# Patient Record
Sex: MALE | Race: WHITE | NOT HISPANIC OR LATINO | Employment: UNEMPLOYED | URBAN - METROPOLITAN AREA
[De-identification: names, ages, dates, MRNs, and addresses within clinical notes are randomized per-mention and may not be internally consistent; named-entity substitution may affect disease eponyms.]

---

## 2017-11-06 ENCOUNTER — HISTORICAL (OUTPATIENT)
Dept: RADIOLOGY | Facility: HOSPITAL | Age: 67
End: 2017-11-06

## 2018-10-08 ENCOUNTER — HISTORICAL (OUTPATIENT)
Dept: RADIOLOGY | Facility: HOSPITAL | Age: 68
End: 2018-10-08

## 2018-10-10 ENCOUNTER — HISTORICAL (OUTPATIENT)
Dept: RADIOLOGY | Facility: HOSPITAL | Age: 68
End: 2018-10-10

## 2018-11-01 ENCOUNTER — HISTORICAL (OUTPATIENT)
Dept: RADIOLOGY | Facility: HOSPITAL | Age: 68
End: 2018-11-01

## 2018-11-21 ENCOUNTER — HISTORICAL (OUTPATIENT)
Dept: SURGERY | Facility: HOSPITAL | Age: 68
End: 2018-11-21

## 2019-03-04 ENCOUNTER — HISTORICAL (OUTPATIENT)
Dept: RADIOLOGY | Facility: HOSPITAL | Age: 69
End: 2019-03-04

## 2019-03-04 LAB — POC CREATININE: 1 MG/DL (ref 0.6–1.3)

## 2019-06-25 ENCOUNTER — HISTORICAL (OUTPATIENT)
Dept: RESPIRATORY THERAPY | Facility: HOSPITAL | Age: 69
End: 2019-06-25

## 2019-11-01 ENCOUNTER — HISTORICAL (OUTPATIENT)
Dept: RADIOLOGY | Facility: HOSPITAL | Age: 69
End: 2019-11-01

## 2021-11-22 ENCOUNTER — HISTORICAL (OUTPATIENT)
Dept: RADIOLOGY | Facility: HOSPITAL | Age: 71
End: 2021-11-22

## 2022-04-11 ENCOUNTER — HISTORICAL (OUTPATIENT)
Dept: ADMINISTRATIVE | Facility: HOSPITAL | Age: 72
End: 2022-04-11

## 2022-04-25 VITALS
OXYGEN SATURATION: 97 % | BODY MASS INDEX: 32.65 KG/M2 | DIASTOLIC BLOOD PRESSURE: 73 MMHG | HEIGHT: 67 IN | SYSTOLIC BLOOD PRESSURE: 144 MMHG | WEIGHT: 208 LBS

## 2023-04-05 DIAGNOSIS — E04.1 THYROID NODULE: Primary | ICD-10-CM

## 2023-04-05 DIAGNOSIS — E03.9 MYXEDEMA HEART DISEASE: ICD-10-CM

## 2023-04-05 DIAGNOSIS — I51.9 MYXEDEMA HEART DISEASE: ICD-10-CM

## 2023-04-12 ENCOUNTER — HOSPITAL ENCOUNTER (OUTPATIENT)
Dept: RADIOLOGY | Facility: HOSPITAL | Age: 73
Discharge: HOME | End: 2023-04-12
Attending: NURSE PRACTITIONER
Payer: MEDICARE

## 2023-04-12 DIAGNOSIS — E04.1 THYROID NODULE: ICD-10-CM

## 2023-04-12 DIAGNOSIS — I51.9 MYXEDEMA HEART DISEASE: ICD-10-CM

## 2023-04-12 DIAGNOSIS — E03.9 MYXEDEMA HEART DISEASE: ICD-10-CM

## 2023-04-12 PROCEDURE — 76536 US EXAM OF HEAD AND NECK: CPT | Mod: TC

## 2023-11-20 DIAGNOSIS — B02.8 OTITIS EXTERNA DUE TO HERPES ZOSTER: Primary | ICD-10-CM

## 2023-11-22 ENCOUNTER — HOSPITAL ENCOUNTER (OUTPATIENT)
Dept: RADIOLOGY | Facility: HOSPITAL | Age: 73
Discharge: HOME | End: 2023-11-22
Payer: MEDICARE

## 2023-11-22 DIAGNOSIS — B02.8 OTITIS EXTERNA DUE TO HERPES ZOSTER: ICD-10-CM

## 2023-11-22 LAB
CREAT SERPL-MCNC: 1 MG/DL (ref 0.5–1.4)
SAMPLE: NORMAL

## 2023-11-22 PROCEDURE — 70482 CT ORBIT/EAR/FOSSA W/O&W/DYE: CPT | Mod: TC,59

## 2023-11-22 PROCEDURE — 25500020 PHARM REV CODE 255

## 2023-11-22 PROCEDURE — 70470 CT HEAD/BRAIN W/O & W/DYE: CPT | Mod: TC

## 2023-11-22 RX ADMIN — IOPAMIDOL 100 ML: 755 INJECTION, SOLUTION INTRAVENOUS at 10:11

## 2024-07-29 ENCOUNTER — CLINICAL SUPPORT (OUTPATIENT)
Dept: RESPIRATORY THERAPY | Facility: HOSPITAL | Age: 74
End: 2024-07-29
Attending: INTERNAL MEDICINE
Payer: MEDICARE

## 2024-07-29 DIAGNOSIS — Z86.010 PERSONAL HISTORY OF COLONIC POLYPS: ICD-10-CM

## 2024-07-29 DIAGNOSIS — Z86.010 PERSONAL HISTORY OF COLONIC POLYPS: Primary | ICD-10-CM

## 2024-07-29 LAB
OHS QRS DURATION: 82 MS
OHS QTC CALCULATION: 424 MS

## 2024-07-29 PROCEDURE — 93005 ELECTROCARDIOGRAM TRACING: CPT

## 2024-07-29 PROCEDURE — 93010 ELECTROCARDIOGRAM REPORT: CPT | Mod: ,,, | Performed by: INTERNAL MEDICINE

## 2024-07-30 DIAGNOSIS — R25.1 TREMOR: Primary | ICD-10-CM

## 2024-09-06 ENCOUNTER — HOSPITAL ENCOUNTER (EMERGENCY)
Facility: HOSPITAL | Age: 74
Discharge: HOME | End: 2024-09-06
Attending: EMERGENCY MEDICINE
Payer: MEDICARE

## 2024-09-06 VITALS
OXYGEN SATURATION: 94 % | HEART RATE: 52 BPM | TEMPERATURE: 98 F | HEIGHT: 67 IN | BODY MASS INDEX: 31.55 KG/M2 | WEIGHT: 201 LBS | RESPIRATION RATE: 16 BRPM | SYSTOLIC BLOOD PRESSURE: 151 MMHG | DIASTOLIC BLOOD PRESSURE: 73 MMHG

## 2024-09-06 DIAGNOSIS — R10.9 RIGHT FLANK PAIN: ICD-10-CM

## 2024-09-06 DIAGNOSIS — N20.1 URETEROLITHIASIS: Primary | ICD-10-CM

## 2024-09-06 LAB
ALBUMIN SERPL-MCNC: 4.2 G/DL (ref 3.4–4.8)
ALBUMIN/GLOB SERPL: 1.3 RATIO (ref 1.1–2)
ALP SERPL-CCNC: 61 UNIT/L (ref 40–150)
ALT SERPL-CCNC: 15 UNIT/L (ref 0–55)
ANION GAP SERPL CALC-SCNC: 10 MEQ/L
AST SERPL-CCNC: 14 UNIT/L (ref 5–34)
BASOPHILS # BLD AUTO: 0.03 X10(3)/MCL
BASOPHILS NFR BLD AUTO: 0.2 %
BILIRUB SERPL-MCNC: 1.1 MG/DL
BILIRUB UR QL STRIP.AUTO: NEGATIVE
BUN SERPL-MCNC: 27 MG/DL (ref 8.4–25.7)
CALCIUM SERPL-MCNC: 10.2 MG/DL (ref 8.8–10)
CHLORIDE SERPL-SCNC: 102 MMOL/L (ref 98–107)
CLARITY UR: CLEAR
CO2 SERPL-SCNC: 26 MMOL/L (ref 23–31)
COLOR UR AUTO: YELLOW
CREAT SERPL-MCNC: 1.94 MG/DL (ref 0.73–1.18)
CREAT/UREA NIT SERPL: 14
EOSINOPHIL # BLD AUTO: 0.09 X10(3)/MCL (ref 0–0.9)
EOSINOPHIL NFR BLD AUTO: 0.6 %
ERYTHROCYTE [DISTWIDTH] IN BLOOD BY AUTOMATED COUNT: 14 % (ref 11.5–17)
GFR SERPLBLD CREATININE-BSD FMLA CKD-EPI: 36 ML/MIN/1.73/M2
GLOBULIN SER-MCNC: 3.3 GM/DL (ref 2.4–3.5)
GLUCOSE SERPL-MCNC: 127 MG/DL (ref 82–115)
GLUCOSE UR QL STRIP: NEGATIVE
HCT VFR BLD AUTO: 42.3 % (ref 42–52)
HGB BLD-MCNC: 14.3 G/DL (ref 14–18)
HGB UR QL STRIP: NEGATIVE
IMM GRANULOCYTES # BLD AUTO: 0.06 X10(3)/MCL (ref 0–0.04)
IMM GRANULOCYTES NFR BLD AUTO: 0.4 %
KETONES UR QL STRIP: NEGATIVE
LEUKOCYTE ESTERASE UR QL STRIP: NEGATIVE
LYMPHOCYTES # BLD AUTO: 1.16 X10(3)/MCL (ref 0.6–4.6)
LYMPHOCYTES NFR BLD AUTO: 8.2 %
MCH RBC QN AUTO: 29.3 PG (ref 27–31)
MCHC RBC AUTO-ENTMCNC: 33.8 G/DL (ref 33–36)
MCV RBC AUTO: 86.7 FL (ref 80–94)
MONOCYTES # BLD AUTO: 1.66 X10(3)/MCL (ref 0.1–1.3)
MONOCYTES NFR BLD AUTO: 11.7 %
NEUTROPHILS # BLD AUTO: 11.21 X10(3)/MCL (ref 2.1–9.2)
NEUTROPHILS NFR BLD AUTO: 78.9 %
NITRITE UR QL STRIP: NEGATIVE
PH UR STRIP: 5 [PH]
PLATELET # BLD AUTO: 212 X10(3)/MCL (ref 130–400)
PMV BLD AUTO: 9.7 FL (ref 7.4–10.4)
POTASSIUM SERPL-SCNC: 4 MMOL/L (ref 3.5–5.1)
PROT SERPL-MCNC: 7.5 GM/DL (ref 5.8–7.6)
PROT UR QL STRIP: NEGATIVE
RBC # BLD AUTO: 4.88 X10(6)/MCL (ref 4.7–6.1)
SODIUM SERPL-SCNC: 138 MMOL/L (ref 136–145)
SP GR UR STRIP.AUTO: 1.02 (ref 1–1.03)
UROBILINOGEN UR STRIP-ACNC: 0.2
WBC # BLD AUTO: 14.21 X10(3)/MCL (ref 4.5–11.5)

## 2024-09-06 PROCEDURE — 85025 COMPLETE CBC W/AUTO DIFF WBC: CPT | Performed by: EMERGENCY MEDICINE

## 2024-09-06 PROCEDURE — 99285 EMERGENCY DEPT VISIT HI MDM: CPT | Mod: 25

## 2024-09-06 PROCEDURE — 80053 COMPREHEN METABOLIC PANEL: CPT | Performed by: EMERGENCY MEDICINE

## 2024-09-06 PROCEDURE — 63600175 PHARM REV CODE 636 W HCPCS: Performed by: EMERGENCY MEDICINE

## 2024-09-06 PROCEDURE — 96361 HYDRATE IV INFUSION ADD-ON: CPT

## 2024-09-06 PROCEDURE — 96374 THER/PROPH/DIAG INJ IV PUSH: CPT

## 2024-09-06 PROCEDURE — 81003 URINALYSIS AUTO W/O SCOPE: CPT | Performed by: EMERGENCY MEDICINE

## 2024-09-06 RX ORDER — KETOROLAC TROMETHAMINE 30 MG/ML
15 INJECTION, SOLUTION INTRAMUSCULAR; INTRAVENOUS
Status: COMPLETED | OUTPATIENT
Start: 2024-09-06 | End: 2024-09-06

## 2024-09-06 RX ORDER — DOXYCYCLINE 100 MG/1
100 CAPSULE ORAL 2 TIMES DAILY
Qty: 14 CAPSULE | Refills: 0 | Status: SHIPPED | OUTPATIENT
Start: 2024-09-06 | End: 2024-09-13

## 2024-09-06 RX ORDER — HYDROCODONE BITARTRATE AND ACETAMINOPHEN 7.5; 325 MG/1; MG/1
1 TABLET ORAL EVERY 6 HOURS PRN
Qty: 8 TABLET | Refills: 0 | Status: SHIPPED | OUTPATIENT
Start: 2024-09-06 | End: 2024-09-14

## 2024-09-06 RX ORDER — ONDANSETRON 4 MG/1
4 TABLET, ORALLY DISINTEGRATING ORAL EVERY 6 HOURS PRN
Qty: 1 TABLET | Refills: 0 | Status: SHIPPED | OUTPATIENT
Start: 2024-09-06

## 2024-09-06 RX ORDER — TAMSULOSIN HYDROCHLORIDE 0.4 MG/1
0.4 CAPSULE ORAL DAILY
Qty: 10 CAPSULE | Refills: 0 | Status: SHIPPED | OUTPATIENT
Start: 2024-09-06 | End: 2025-09-06

## 2024-09-06 RX ADMIN — KETOROLAC TROMETHAMINE 15 MG: 30 INJECTION, SOLUTION INTRAMUSCULAR at 06:09

## 2024-09-06 RX ADMIN — SODIUM CHLORIDE, POTASSIUM CHLORIDE, SODIUM LACTATE AND CALCIUM CHLORIDE 1000 ML: 600; 310; 30; 20 INJECTION, SOLUTION INTRAVENOUS at 06:09

## 2024-09-06 NOTE — ED PROVIDER NOTES
"Encounter Date: 9/6/2024       History     Chief Complaint   Patient presents with    Flank Pain     Patient c/o right flank pain with hx of kidney stones. Patient also reports dysuria.     This is a 74-year-old gentleman with a history of noninsulin requiring diabetes, Hashimoto's thyroiditis, hypertension, as well as nephrolithiasis/ureterolithiasis who presents complaining of moderate severe right flank pain that is been at its worse since approximately 1:00 a.m. this morning.  Says that he has had some dull, aching, intermittent painful with the antecedent 2 days.  However, last night "I just could not get comfortable" and "I think this is a kidney stone. "  Positive nausea, 1 emetic episode in the last 12 hours, no jasmyn hematuria, no fever, chills, melena, or hematochezia.        Review of patient's allergies indicates:  No Known Allergies  Past Medical History:   Diagnosis Date    Diabetes mellitus     Hashimoto's disease     Hypertension     Kidney stones     Thyroid disease      Past Surgical History:   Procedure Laterality Date    APPENDECTOMY      JOINT REPLACEMENT      repair of intestinal fistula      TONSILLECTOMY       No family history on file.  Social History     Tobacco Use    Smoking status: Never    Smokeless tobacco: Never   Substance Use Topics    Drug use: Never     Review of Systems   Constitutional:  Negative for chills, diaphoresis and fever.   HENT: Negative.     Eyes:  Negative for visual disturbance.   Respiratory:  Negative for shortness of breath.    Cardiovascular:  Negative for chest pain.   Gastrointestinal:  Negative for nausea.   Genitourinary:  Negative for difficulty urinating, dysuria, hematuria, scrotal swelling, testicular pain and urgency.   Musculoskeletal:  Negative for back pain.   Skin:  Negative for wound.   Neurological:  Negative for headaches.   Hematological:  Does not bruise/bleed easily.   Psychiatric/Behavioral: Negative.     All other systems reviewed and are " negative.      Physical Exam     Initial Vitals [09/06/24 0543]   BP Pulse Resp Temp SpO2   130/71 (!) 54 16 98.2 °F (36.8 °C) 97 %      MAP       --         Physical Exam    Nursing note and vitals reviewed.  Constitutional: He appears well-developed and well-nourished. He is not diaphoretic. No distress.   HENT:   Head: Normocephalic and atraumatic.   Mouth/Throat: Uvula is midline, oropharynx is clear and moist and mucous membranes are normal.   Eyes: Conjunctivae and EOM are normal. Pupils are equal, round, and reactive to light.   Neck: Neck supple. No Brudzinski's sign and no Kernig's sign noted. Carotid bruit is not present. No JVD present.   Normal range of motion.  Cardiovascular:  Normal rate, regular rhythm, normal heart sounds and intact distal pulses.     Exam reveals no gallop and no friction rub.       No murmur heard.  Pulmonary/Chest: Breath sounds normal. No respiratory distress. He has no wheezes. He has no rhonchi. He has no rales.   Abdominal: Abdomen is soft. Bowel sounds are normal. He exhibits no distension and no mass. There is no abdominal tenderness.   Musculoskeletal:         General: Normal range of motion.        Arms:       Cervical back: Normal range of motion and neck supple.     Lymphadenopathy:     He has no cervical adenopathy.   Neurological: He is alert and oriented to person, place, and time. He has normal strength. No sensory deficit. GCS score is 15. GCS eye subscore is 4. GCS verbal subscore is 5. GCS motor subscore is 6.   Skin: Skin is warm and dry. Capillary refill takes less than 2 seconds. No rash noted.   Psychiatric: He has a normal mood and affect. His behavior is normal. Judgment and thought content normal.         ED Course   Procedures  Labs Reviewed   COMPREHENSIVE METABOLIC PANEL - Abnormal       Result Value    Sodium 138      Potassium 4.0      Chloride 102      CO2 26      Glucose 127 (*)     Blood Urea Nitrogen 27.0 (*)     Creatinine 1.94 (*)     Calcium  10.2 (*)     Protein Total 7.5      Albumin 4.2      Globulin 3.3      Albumin/Globulin Ratio 1.3      Bilirubin Total 1.1      ALP 61      ALT 15      AST 14      eGFR 36      Anion Gap 10.0      BUN/Creatinine Ratio 14     CBC WITH DIFFERENTIAL - Abnormal    WBC 14.21 (*)     RBC 4.88      Hgb 14.3      Hct 42.3      MCV 86.7      MCH 29.3      MCHC 33.8      RDW 14.0      Platelet 212      MPV 9.7      Neut % 78.9      Lymph % 8.2      Mono % 11.7      Eos % 0.6      Basophil % 0.2      Lymph # 1.16      Neut # 11.21 (*)     Mono # 1.66 (*)     Eos # 0.09      Baso # 0.03      IG# 0.06 (*)     IG% 0.4     URINALYSIS, REFLEX TO URINE CULTURE - Normal    Color, UA Yellow      Appearance, UA Clear      Specific Gravity, UA 1.025      pH, UA 5.0      Protein, UA Negative      Glucose, UA Negative      Ketones, UA Negative      Blood, UA Negative      Bilirubin, UA Negative      Urobilinogen, UA 0.2      Nitrites, UA Negative      Leukocyte Esterase, UA Negative     CBC W/ AUTO DIFFERENTIAL    Narrative:     The following orders were created for panel order CBC auto differential.  Procedure                               Abnormality         Status                     ---------                               -----------         ------                     CBC with Differential[[id number]]        Abnormal            Final result                 Please view results for these tests on the individual orders.          Imaging Results              CT Abdomen Pelvis  Without Contrast (Final result)  Result time 09/06/24 08:15:49      Final result by Jose Elias Nunez MD (09/06/24 08:15:49)                   Narrative:    EXAMINATION  CT ABDOMEN PELVIS WITHOUT CONTRAST    CLINICAL HISTORY  Flank pain, kidney stone suspected;    TECHNIQUE  Non-contrast helical-acquisition CT images were obtained and multiplanar reformats accomplished by a CT technologist at a separate workstation, pushed to PACS for physician review.    Enteric  contrast: none    COMPARISON  21 April 2019    FINDINGS  Images were reviewed in soft tissue, lung, and bone windows.    Exam quality: Inherently limited evaluation of the abdominopelvic organs and vasculature secondary to lack of IV contrast.    Lines/tubes: none visualized    No acute findings of the included lower thoracic cavity.    Gallbladder and biliary system, liver, pancreas, and spleen are unremarkable.  No suspicious adrenal nodule.  Similar right posterior renal cortex cyst.  Approximately 3 mm nonobstructing posterior left caliceal stone is present.  There is also a 2 mm stone at the right UVJ with mild associated hydroureteronephrosis.  Urinary bladder is unremarkable.  Prostate is normal for CT appearance.    No findings of high-grade bowel obstruction or other acute GI tract process.  Appendix is unremarkable.  There is no convincing focal lesion of the colon.    No free intra-abdominal air or fluid.  Aortoiliac structures are mildly atherosclerotic but nonaneurysmal.  There is no pathologic lymph node enlargement.    Degenerative changes present through the visualized spinal column.  No acute or destructive osseous process.  Body wall subcutaneous tissues and regional muscular structures are without acute finding.  Uncomplicated fat containing bilateral inguinal hernia is incidentally noted.    IMPRESSION  1. Minimally obstructing 2 mm right UVJ stone.  Additional nonobstructing left nephrolithiasis.  2. Redemonstrated posterior right renal cortex cyst with no aggressive features by non-contrast CT appearance.  3. Additional chronic secondary findings discussed above.    RADIATION DOSE  Automated tube current modulation, weight-based exposure dosing, and/or iterative reconstruction technique utilized to reach lowest reasonably achievable exposure rate.    DLP: 372 mGy*cm      Electronically signed by: Jose Elias Nunez  Date:    09/06/2024  Time:    08:15                                     Medications    lactated ringers bolus 1,000 mL (0 mLs Intravenous Stopped 9/6/24 0706)   ketorolac injection 15 mg (15 mg Intravenous Given 9/6/24 0607)     Medical Decision Making  Amount and/or Complexity of Data Reviewed  Labs:  Decision-making details documented in ED Course.    Risk  Prescription drug management.               ED Course as of 09/06/24 0938   Fri Sep 06, 2024   0617 WBC(!): 14.21 [KJ]   0635 Potassium: 4.0 [KJ]   0636 Glucose(!): 127 [KJ]   0636 BUN(!): 27.0 [KJ]   0636 Creatinine(!): 1.94 [KJ]   0636 Calcium(!): 10.2 [KJ]   0756 Awaiting radiology interpretation [KJ]   0828 IMPRESSION  1. Minimally obstructing 2 mm right UVJ stone.  Additional nonobstructing left nephrolithiasis.  2. Redemonstrated posterior right renal cortex cyst with no aggressive features by non-contrast CT appearance.  3. Additional chronic secondary findings discussed above.          DLP: 372 mGy*cm        Electronically signed by:Jose Elias Nunez  Date:                                            09/06/2024  Time:                                           08:15   [KJ]   0915 Patient resting comfortably in the bed upon my presenting to reassess: Soundly asleep.  No pain.  Findings discussed with patient and wife (at bedside).  Will refer to Urology (they have none at this time).  Elevated white count of 14.5 noted: No systemic signs or symptoms of an infected stone; however, we will treat with doxy for approximately 7 days: Most likely this is demargination, however, we will air to the side of caution given the patient's age and a history of diabetes. [KJ]      ED Course User Index  [KJ] Christiano Cotter MD                           Clinical Impression:  Final diagnoses:  [N20.1] Ureterolithiasis (Primary)  [R10.9] Right flank pain          ED Disposition Condition    Discharge Stable          ED Prescriptions       Medication Sig Dispense Start Date End Date Auth. Provider    tamsulosin (FLOMAX) 0.4 mg Cap Take 1 capsule (0.4 mg total)  by mouth once daily. 10 capsule 9/6/2024 9/6/2025 Christiano Cotter MD    doxycycline (VIBRAMYCIN) 100 MG Cap Take 1 capsule (100 mg total) by mouth 2 (two) times daily. for 7 days 14 capsule 9/6/2024 9/13/2024 Christiano Cotter MD    HYDROcodone-acetaminophen (NORCO) 7.5-325 mg per tablet Take 1 tablet by mouth every 6 (six) hours as needed for Pain. 8 tablet 9/6/2024 9/14/2024 Christiano Cotter MD    ondansetron (ZOFRAN-ODT) 4 MG TbDL Take 1 tablet (4 mg total) by mouth every 6 (six) hours as needed (nausea). 1 tablet 9/6/2024 -- Christiano Cotter MD          Follow-up Information       Follow up With Specialties Details Why Contact Info    Dr. RAMIREZ - UROLOGY  In 5 days For FOLLOW-UP for today's problem           Afebrile. Vasomotor Stable. Ambulating in the ED. Pleased with care.  Patient specifically advised, by me, to RETURN to the emergency department for ANY deterioration, detrimental changes, failure to improve, feeling worse and/or ANY concerns whatsoever. Verbalized understanding and agreed to comply.        This treatment record was composed utilizing a combination of typing and the M*Modal Fluency Direct dictation system. Some errors in transcription, wording and spelling are, therefore, to be expected.       Christiano Cotter MD  09/06/24 0940

## 2024-09-06 NOTE — DISCHARGE INSTRUCTIONS
1. Drink plenty of fluids for the next 24 - 48 hrs.    2. Strain ALL urine for the next 48 hours. SAVE any collected stones or other materials to give to/show your doctor.    3. UROLOGY appointment and follow-up: Call and make an appointment to see YOUR UROLOGIST or the urologist referred to you herein.    4. Return for re-evaluation should you develop increasing and un-remitting PAIN, FEVER, or RECURRENT VOMITING.    5. Take the pain medicine and nausea medication as prescribed for relief.

## 2024-10-03 ENCOUNTER — OFFICE VISIT (OUTPATIENT)
Dept: NEUROLOGY | Facility: CLINIC | Age: 74
End: 2024-10-03
Payer: MEDICARE

## 2024-10-03 VITALS
BODY MASS INDEX: 31.39 KG/M2 | WEIGHT: 200 LBS | SYSTOLIC BLOOD PRESSURE: 140 MMHG | HEIGHT: 67 IN | DIASTOLIC BLOOD PRESSURE: 76 MMHG

## 2024-10-03 DIAGNOSIS — G25.0 ESSENTIAL TREMOR: Primary | ICD-10-CM

## 2024-10-03 DIAGNOSIS — R25.1 TREMOR: ICD-10-CM

## 2024-10-03 PROCEDURE — 99213 OFFICE O/P EST LOW 20 MIN: CPT | Mod: PBBFAC | Performed by: SPECIALIST

## 2024-10-03 PROCEDURE — 99204 OFFICE O/P NEW MOD 45 MIN: CPT | Mod: S$PBB,,, | Performed by: SPECIALIST

## 2024-10-03 PROCEDURE — 99999 PR PBB SHADOW E&M-EST. PATIENT-LVL III: CPT | Mod: PBBFAC,,, | Performed by: SPECIALIST

## 2024-10-03 RX ORDER — CHOLECALCIFEROL (VITAMIN D3) 25 MCG
1000 TABLET ORAL DAILY
COMMUNITY

## 2024-10-03 RX ORDER — PANTOPRAZOLE SODIUM 40 MG/1
40 TABLET, DELAYED RELEASE ORAL DAILY
COMMUNITY

## 2024-10-03 RX ORDER — LEVOTHYROXINE SODIUM 100 UG/1
100 TABLET ORAL
COMMUNITY
Start: 2024-09-20

## 2024-10-03 RX ORDER — AMLODIPINE BESYLATE 10 MG/1
TABLET ORAL
COMMUNITY
Start: 2024-04-26

## 2024-10-03 RX ORDER — ASPIRIN 81 MG/1
81 TABLET ORAL DAILY
COMMUNITY

## 2024-10-03 RX ORDER — SILDENAFIL 25 MG/1
25 TABLET, FILM COATED ORAL DAILY PRN
COMMUNITY

## 2024-10-03 RX ORDER — EPINEPHRINE 0.15 MG/.3ML
0.15 INJECTION INTRAMUSCULAR
COMMUNITY

## 2024-10-03 RX ORDER — SITAGLIPTIN AND METFORMIN HYDROCHLORIDE 50; 500 MG/1; MG/1
1 TABLET, FILM COATED, EXTENDED RELEASE ORAL
COMMUNITY
Start: 2024-09-16

## 2024-10-03 RX ORDER — LOSARTAN POTASSIUM AND HYDROCHLOROTHIAZIDE 12.5; 1 MG/1; MG/1
TABLET ORAL
COMMUNITY
Start: 2024-04-26

## 2024-10-03 RX ORDER — PRIMIDONE 50 MG/1
TABLET ORAL
Qty: 90 TABLET | Refills: 5 | Status: SHIPPED | OUTPATIENT
Start: 2024-10-03

## 2024-10-03 NOTE — PROGRESS NOTES
"Subjective:      @Patient ID: Himanshu Wise Jr. is a 74 y.o. male.    Chief Complaint/referral reason/HPI:   Chief Complaint   Patient presents with    NP ref for neuro cons to eval for Tremors.      B hand tremors w left being worse x 8-8 mon and they are getting worse over the last 2 months.. Does [employer] work and his tremors get worse when he tries to so this work. No recent brain imaging. Pts daughter (Meaghan) and grand daughter (Lakisha) are her today.        Kay SWANSON hx comments:      See also 'facesheet' under media for handwritten notes     Review of Systems         Marital status:     [] Working     [] Retired, worked as:   [] Drives     [] Does not drive     -------------------------------------    Diabetes mellitus    Hashimoto's disease    Hypertension    Kidney stones    Thyroid disease     ..  Current Outpatient Medications   Medication Instructions    amLODIPine (NORVASC) 10 MG tablet     aspirin (ECOTRIN) 81 mg, Daily    EPINEPHrine (EPIPEN JR) 0.15 mg, As needed (PRN)    JANUMET XR  mg TM24 1 tablet    levothyroxine (SYNTHROID) 100 mcg    losartan-hydrochlorothiazide 100-12.5 mg (HYZAAR) 100-12.5 mg Tab     ondansetron (ZOFRAN-ODT) 4 mg, Oral, Every 6 hours PRN    pantoprazole (PROTONIX) 40 mg, Daily    sildenafiL (VIAGRA) 25 mg, Daily PRN    tamsulosin (FLOMAX) 0.4 mg, Oral, Daily    vitamin D (VITAMIN D3) 1,000 Units, Daily      Objective:      Exam:   Visit Vitals  BP (!) 140/76   Ht 5' 7" (1.702 m)   Wt 90.7 kg (200 lb)   BMI 31.32 kg/m²     General Exam  If Accompanied, by__       body habitus_ Body mass index is 31.32 kg/m².  Gen exam     Neurological:  Exam comments:  .            No data to display                   Neuroimaging:  [] Images and imaging reports reviewed. Rads summary:  My comments:     Labs:    [x]  New Patient         []  Multiple Issues/ diagnoses or problems  [if not enumerated in note then discussed but not documented]    Complexity of Data:   [] High    [x] " Moderate   [] Images and reports reviewed [x] History obtained from accompaniment  [] Studies ordered [] Studies consid or discussed, not ordered   [x] Differential Diagnoses discussed       Risks:   [] High     [x] Moderate   [x] (poss or def) neurodegenerative condition [] () autoimmune condition with possibility of flares or unexpected attack  [] () seiz d.o. with possib of recurr seiz's  [] Cerebrovasc ds with risk of recurrent stroke  [x] CNS meds (and/or) potentially high risk non CNS meds taken or discussed which may cause med or behav SE's  [x] Fall risk [] Driving discussed  [] Diagnosis unclear or DDx wide making risk uncertain   []:    MDM:    [] High     [x] Moderate       Assessment/Plan:         ICD-10-CM ICD-9-CM   1. Essential tremor  G25.0 333.1   2. Tremor  R25.1 781.0         Other Comments / Follow Up:        Medications Ordered This Encounter   Medications    primidone (MYSOLINE) 50 MG Tab     Sig: One nightly one mo then two nightly one mo then three nightly     Dispense:  90 tablet     Refill:  5      Aim f to f revisit feb or four mos           Christiano Bolaños MD DESI FAAN, [facility]  [facility] Medical Director   [facility]